# Patient Record
Sex: FEMALE | Race: BLACK OR AFRICAN AMERICAN
[De-identification: names, ages, dates, MRNs, and addresses within clinical notes are randomized per-mention and may not be internally consistent; named-entity substitution may affect disease eponyms.]

---

## 2018-03-13 ENCOUNTER — RESULT REVIEW (OUTPATIENT)
Age: 73
End: 2018-03-13

## 2018-05-11 PROBLEM — Z00.00 ENCOUNTER FOR PREVENTIVE HEALTH EXAMINATION: Status: ACTIVE | Noted: 2018-05-11

## 2018-05-14 ENCOUNTER — OUTPATIENT (OUTPATIENT)
Dept: OUTPATIENT SERVICES | Facility: HOSPITAL | Age: 73
LOS: 1 days | End: 2018-05-14
Payer: MEDICARE

## 2018-05-14 VITALS
HEIGHT: 66 IN | SYSTOLIC BLOOD PRESSURE: 130 MMHG | TEMPERATURE: 98 F | DIASTOLIC BLOOD PRESSURE: 74 MMHG | HEART RATE: 64 BPM | WEIGHT: 162.92 LBS | RESPIRATION RATE: 16 BRPM

## 2018-05-14 DIAGNOSIS — Z98.890 OTHER SPECIFIED POSTPROCEDURAL STATES: Chronic | ICD-10-CM

## 2018-05-14 DIAGNOSIS — Z91.041 RADIOGRAPHIC DYE ALLERGY STATUS: ICD-10-CM

## 2018-05-14 DIAGNOSIS — I10 ESSENTIAL (PRIMARY) HYPERTENSION: ICD-10-CM

## 2018-05-14 DIAGNOSIS — N95.0 POSTMENOPAUSAL BLEEDING: ICD-10-CM

## 2018-05-14 DIAGNOSIS — N83.9 NONINFLAMMATORY DISORDER OF OVARY, FALLOPIAN TUBE AND BROAD LIGAMENT, UNSPECIFIED: ICD-10-CM

## 2018-05-14 LAB
BLD GP AB SCN SERPL QL: NEGATIVE — SIGNIFICANT CHANGE UP
BUN SERPL-MCNC: 18 MG/DL — SIGNIFICANT CHANGE UP (ref 7–23)
CALCIUM SERPL-MCNC: 9.9 MG/DL — SIGNIFICANT CHANGE UP (ref 8.4–10.5)
CHLORIDE SERPL-SCNC: 101 MMOL/L — SIGNIFICANT CHANGE UP (ref 98–107)
CO2 SERPL-SCNC: 26 MMOL/L — SIGNIFICANT CHANGE UP (ref 22–31)
CREAT SERPL-MCNC: 0.89 MG/DL — SIGNIFICANT CHANGE UP (ref 0.5–1.3)
GLUCOSE SERPL-MCNC: 82 MG/DL — SIGNIFICANT CHANGE UP (ref 70–99)
HBA1C BLD-MCNC: 5.9 % — HIGH (ref 4–5.6)
HCT VFR BLD CALC: 38.1 % — SIGNIFICANT CHANGE UP (ref 34.5–45)
HGB BLD-MCNC: 11.9 G/DL — SIGNIFICANT CHANGE UP (ref 11.5–15.5)
MCHC RBC-ENTMCNC: 22.9 PG — LOW (ref 27–34)
MCHC RBC-ENTMCNC: 31.2 % — LOW (ref 32–36)
MCV RBC AUTO: 73.4 FL — LOW (ref 80–100)
NRBC # FLD: 0 — SIGNIFICANT CHANGE UP
PLATELET # BLD AUTO: 201 K/UL — SIGNIFICANT CHANGE UP (ref 150–400)
PMV BLD: 12.5 FL — SIGNIFICANT CHANGE UP (ref 7–13)
POTASSIUM SERPL-MCNC: 3.4 MMOL/L — LOW (ref 3.5–5.3)
POTASSIUM SERPL-SCNC: 3.4 MMOL/L — LOW (ref 3.5–5.3)
RBC # BLD: 5.19 M/UL — SIGNIFICANT CHANGE UP (ref 3.8–5.2)
RBC # FLD: 14.7 % — HIGH (ref 10.3–14.5)
RH IG SCN BLD-IMP: POSITIVE — SIGNIFICANT CHANGE UP
SODIUM SERPL-SCNC: 141 MMOL/L — SIGNIFICANT CHANGE UP (ref 135–145)
WBC # BLD: 7.81 K/UL — SIGNIFICANT CHANGE UP (ref 3.8–10.5)
WBC # FLD AUTO: 7.81 K/UL — SIGNIFICANT CHANGE UP (ref 3.8–10.5)

## 2018-05-14 PROCEDURE — 93010 ELECTROCARDIOGRAM REPORT: CPT

## 2018-05-14 RX ORDER — SODIUM CHLORIDE 9 MG/ML
1000 INJECTION, SOLUTION INTRAVENOUS
Qty: 0 | Refills: 0 | Status: DISCONTINUED | OUTPATIENT
Start: 2018-05-17 | End: 2018-05-18

## 2018-05-14 RX ORDER — SODIUM CHLORIDE 9 MG/ML
3 INJECTION INTRAMUSCULAR; INTRAVENOUS; SUBCUTANEOUS EVERY 8 HOURS
Qty: 0 | Refills: 0 | Status: DISCONTINUED | OUTPATIENT
Start: 2018-05-17 | End: 2018-05-18

## 2018-05-14 NOTE — H&P PST ADULT - NEGATIVE ENMT SYMPTOMS
no dysphagia/no throat pain/no nose bleeds/no hearing difficulty/no sinus symptoms/no ear pain/no vertigo/no tinnitus

## 2018-05-14 NOTE — H&P PST ADULT - PMH
Anemia  history of  Gallstones  long time ago, managed conservatively  Glaucoma  s/p surgery with lens implant  HTN - Hypertension    Intramural leiomyoma of uterus    Noninflammatory disorder of ovary, fallopian tube and broad ligament, unspecified    Postmenopausal bleeding

## 2018-05-14 NOTE — H&P PST ADULT - NEGATIVE GASTROINTESTINAL SYMPTOMS
no melena/no diarrhea/no vomiting/no constipation/no change in bowel habits/no nausea/no abdominal pain

## 2018-05-14 NOTE — H&P PST ADULT - ATTENDING COMMENTS
patient was counseled on the risks of the total abdominal hysterectomy and bilateral salpingoophorectomy; pain, bleeding, infection, injury to adjacent organs and vessel, formation of scar tissue, VTE. Questions answered. informed consent signed.

## 2018-05-14 NOTE — H&P PST ADULT - NEGATIVE OPHTHALMOLOGIC SYMPTOMS
no loss of vision L/no blurred vision L/no pain R/no diplopia/no photophobia/no blurred vision R/no discharge R/no loss of vision R/no pain L/no discharge L

## 2018-05-14 NOTE — H&P PST ADULT - HISTORY OF PRESENT ILLNESS
73 year old female presents to presurgical testing with diagnosis of intramural leiomyoma of uterus, noninflammatory disorder of ovary, fallopian tube and broad ligament, unspecified postmenopausal bleeding scheduled for total abdominal hysterectomy bilateral salpingo oophorectomy for 5/17/18. Pt reports postmenopausal bleeding described as mild, which started in 3/2018. Pt completed an MRI and sonogram and referred for surgical intervention.

## 2018-05-14 NOTE — H&P PST ADULT - RS GEN PE MLT RESP DETAILS PC
breath sounds equal/no chest wall tenderness/no rales/no wheezes/good air movement/no rhonchi/airway patent/respirations non-labored/clear to auscultation bilaterally

## 2018-05-14 NOTE — H&P PST ADULT - NEGATIVE NEUROLOGICAL SYMPTOMS
no paresthesias/no syncope/no vertigo/no loss of sensation/no transient paralysis/no loss of consciousness/no hemiparesis/no tremors/no focal seizures/no difficulty walking/no headache/no weakness/no generalized seizures

## 2018-05-14 NOTE — H&P PST ADULT - ASSESSMENT
intramural leiomyoma of uterus  noninflammatory disorder of ovary, fallopian tube and broad ligament, unspecified  postmenopausal bleeding

## 2018-05-14 NOTE — H&P PST ADULT - PROBLEM SELECTOR PLAN 1
Pt is scheduled for total abdominal hysterectomy bilateral salpingo oophorectomy for 5/17/18. Preop instructions, pepcid, surgical scrub provided. Pt stated understanding.

## 2018-05-14 NOTE — H&P PST ADULT - PROBLEM SELECTOR PLAN 2
instructed to take atenolol/chlorthalidone on the morning of procedure. Pending medical evaluation, has appt on 5/16 12 noon.

## 2018-05-14 NOTE — H&P PST ADULT - MUSCULOSKELETAL
details… detailed exam no calf tenderness/ROM intact/no joint erythema/no joint warmth/no joint swelling/normal strength

## 2018-05-14 NOTE — H&P PST ADULT - NEGATIVE CARDIOVASCULAR SYMPTOMS
no chest pain/no paroxysmal nocturnal dyspnea/no peripheral edema/no dyspnea on exertion/no palpitations

## 2018-05-16 ENCOUNTER — TRANSCRIPTION ENCOUNTER (OUTPATIENT)
Age: 73
End: 2018-05-16

## 2018-05-17 ENCOUNTER — INPATIENT (INPATIENT)
Facility: HOSPITAL | Age: 73
LOS: 1 days | Discharge: HOME | End: 2018-05-19
Attending: OBSTETRICS & GYNECOLOGY | Admitting: OBSTETRICS & GYNECOLOGY
Payer: MEDICARE

## 2018-05-17 ENCOUNTER — RESULT REVIEW (OUTPATIENT)
Age: 73
End: 2018-05-17

## 2018-05-17 VITALS
RESPIRATION RATE: 16 BRPM | WEIGHT: 162.92 LBS | HEIGHT: 66 IN | DIASTOLIC BLOOD PRESSURE: 81 MMHG | HEART RATE: 62 BPM | OXYGEN SATURATION: 98 % | TEMPERATURE: 98 F | SYSTOLIC BLOOD PRESSURE: 145 MMHG

## 2018-05-17 DIAGNOSIS — Z98.890 OTHER SPECIFIED POSTPROCEDURAL STATES: Chronic | ICD-10-CM

## 2018-05-17 DIAGNOSIS — N83.9 NONINFLAMMATORY DISORDER OF OVARY, FALLOPIAN TUBE AND BROAD LIGAMENT, UNSPECIFIED: ICD-10-CM

## 2018-05-17 LAB
GLUCOSE BLDV-MCNC: 117 — HIGH (ref 70–99)
POTASSIUM BLDV-SCNC: 3.7 MMOL/L — SIGNIFICANT CHANGE UP (ref 3.4–4.5)
RH IG SCN BLD-IMP: POSITIVE — SIGNIFICANT CHANGE UP

## 2018-05-17 PROCEDURE — 88332 PATH CONSLTJ SURG EA ADD BLK: CPT | Mod: 26

## 2018-05-17 PROCEDURE — 88307 TISSUE EXAM BY PATHOLOGIST: CPT | Mod: 26

## 2018-05-17 PROCEDURE — 88331 PATH CONSLTJ SURG 1 BLK 1SPC: CPT | Mod: 26

## 2018-05-17 RX ORDER — HYDROMORPHONE HYDROCHLORIDE 2 MG/ML
0.5 INJECTION INTRAMUSCULAR; INTRAVENOUS; SUBCUTANEOUS
Qty: 0 | Refills: 0 | Status: DISCONTINUED | OUTPATIENT
Start: 2018-05-17 | End: 2018-05-18

## 2018-05-17 RX ORDER — HYDROMORPHONE HYDROCHLORIDE 2 MG/ML
30 INJECTION INTRAMUSCULAR; INTRAVENOUS; SUBCUTANEOUS
Qty: 0 | Refills: 0 | Status: DISCONTINUED | OUTPATIENT
Start: 2018-05-17 | End: 2018-05-18

## 2018-05-17 RX ORDER — ATENOLOL AND CHLORTHALIDONE 50; 25 MG/1; MG/1
1 TABLET ORAL
Qty: 0 | Refills: 0 | COMMUNITY

## 2018-05-17 RX ORDER — SIMETHICONE 80 MG/1
80 TABLET, CHEWABLE ORAL EVERY 8 HOURS
Qty: 0 | Refills: 0 | Status: DISCONTINUED | OUTPATIENT
Start: 2018-05-17 | End: 2018-05-19

## 2018-05-17 RX ORDER — HYDROMORPHONE HYDROCHLORIDE 2 MG/ML
0.25 INJECTION INTRAMUSCULAR; INTRAVENOUS; SUBCUTANEOUS
Qty: 0 | Refills: 0 | Status: DISCONTINUED | OUTPATIENT
Start: 2018-05-17 | End: 2018-05-17

## 2018-05-17 RX ORDER — HYDROMORPHONE HYDROCHLORIDE 2 MG/ML
0.5 INJECTION INTRAMUSCULAR; INTRAVENOUS; SUBCUTANEOUS
Qty: 0 | Refills: 0 | Status: DISCONTINUED | OUTPATIENT
Start: 2018-05-17 | End: 2018-05-17

## 2018-05-17 RX ORDER — ONDANSETRON 8 MG/1
4 TABLET, FILM COATED ORAL ONCE
Qty: 0 | Refills: 0 | Status: DISCONTINUED | OUTPATIENT
Start: 2018-05-17 | End: 2018-05-17

## 2018-05-17 RX ORDER — SODIUM CHLORIDE 9 MG/ML
1000 INJECTION, SOLUTION INTRAVENOUS
Qty: 0 | Refills: 0 | Status: DISCONTINUED | OUTPATIENT
Start: 2018-05-17 | End: 2018-05-18

## 2018-05-17 RX ORDER — ONDANSETRON 8 MG/1
4 TABLET, FILM COATED ORAL EVERY 6 HOURS
Qty: 0 | Refills: 0 | Status: DISCONTINUED | OUTPATIENT
Start: 2018-05-17 | End: 2018-05-18

## 2018-05-17 RX ORDER — DOCUSATE SODIUM 100 MG
100 CAPSULE ORAL THREE TIMES A DAY
Qty: 0 | Refills: 0 | Status: DISCONTINUED | OUTPATIENT
Start: 2018-05-17 | End: 2018-05-19

## 2018-05-17 RX ORDER — HEPARIN SODIUM 5000 [USP'U]/ML
5000 INJECTION INTRAVENOUS; SUBCUTANEOUS EVERY 12 HOURS
Qty: 0 | Refills: 0 | Status: DISCONTINUED | OUTPATIENT
Start: 2018-05-17 | End: 2018-05-19

## 2018-05-17 RX ORDER — ATENOLOL 25 MG/1
50 TABLET ORAL DAILY
Qty: 0 | Refills: 0 | Status: DISCONTINUED | OUTPATIENT
Start: 2018-05-18 | End: 2018-05-19

## 2018-05-17 RX ORDER — NALOXONE HYDROCHLORIDE 4 MG/.1ML
0.1 SPRAY NASAL
Qty: 0 | Refills: 0 | Status: DISCONTINUED | OUTPATIENT
Start: 2018-05-17 | End: 2018-05-18

## 2018-05-17 RX ADMIN — HYDROMORPHONE HYDROCHLORIDE 30 MILLILITER(S): 2 INJECTION INTRAMUSCULAR; INTRAVENOUS; SUBCUTANEOUS at 15:57

## 2018-05-17 RX ADMIN — SODIUM CHLORIDE 125 MILLILITER(S): 9 INJECTION, SOLUTION INTRAVENOUS at 14:30

## 2018-05-17 RX ADMIN — HYDROMORPHONE HYDROCHLORIDE 30 MILLILITER(S): 2 INJECTION INTRAMUSCULAR; INTRAVENOUS; SUBCUTANEOUS at 19:33

## 2018-05-17 RX ADMIN — SIMETHICONE 80 MILLIGRAM(S): 80 TABLET, CHEWABLE ORAL at 22:58

## 2018-05-17 RX ADMIN — SODIUM CHLORIDE 3 MILLILITER(S): 9 INJECTION INTRAMUSCULAR; INTRAVENOUS; SUBCUTANEOUS at 14:29

## 2018-05-17 RX ADMIN — HYDROMORPHONE HYDROCHLORIDE 30 MILLILITER(S): 2 INJECTION INTRAMUSCULAR; INTRAVENOUS; SUBCUTANEOUS at 13:30

## 2018-05-17 RX ADMIN — SODIUM CHLORIDE 3 MILLILITER(S): 9 INJECTION INTRAMUSCULAR; INTRAVENOUS; SUBCUTANEOUS at 22:46

## 2018-05-17 RX ADMIN — HEPARIN SODIUM 5000 UNIT(S): 5000 INJECTION INTRAVENOUS; SUBCUTANEOUS at 17:24

## 2018-05-17 NOTE — BRIEF OPERATIVE NOTE - POST-OP DX
Dermoid cyst  05/17/2018    Active  Nancy Nguyen  Uterine leiomyoma, unspecified location  05/17/2018    Active  Nancy Nguyen

## 2018-05-17 NOTE — CHART NOTE - NSCHARTNOTEFT_GEN_A_CORE
GYN Post-Op Check     Pt is a 73 POD#0 s/p JAMMIE Bilateral Salpingo-oophorectomy.  Pt seen and examined at bedside. Pt states mild abdominal pain. Pt has not yet been out of bed. She has not yet had a regular diet but is not having n/v.   Pt denies fever, chills, chest pain, SOB, nausea, vomiting, lightheadedness, dizziness.      T(F): 98.1 (05-17-18 @ 13:15), Max: 98.2 (05-17-18 @ 06:41)  HR: 62 (05-17-18 @ 15:56) (55 - 64)  BP: 154/73 (05-17-18 @ 15:56) (135/92 - 155/72)  RR: 16 (05-17-18 @ 15:56) (12 - 21)  SpO2: 99% (05-17-18 @ 15:56) (97% - 100%)  Wt(kg): --  I&O's Summary    17 May 2018 07:01  -  17 May 2018 17:17  --------------------------------------------------------  IN: 375 mL / OUT: 275 mL / NET: 100 mL        MEDICATIONS  (STANDING):  heparin  Injectable 5000 Unit(s) SubCutaneous every 12 hours  HYDROmorphone PCA (1 mG/mL) 30 milliLiter(s) PCA Continuous PCA Continuous  lactated ringers. 1000 milliLiter(s) (30 mL/Hr) IV Continuous <Continuous>  lactated ringers. 1000 milliLiter(s) (125 mL/Hr) IV Continuous <Continuous>  simethicone 80 milliGRAM(s) Chew every 8 hours  sodium chloride 0.9% lock flush 3 milliLiter(s) IV Push every 8 hours    MEDICATIONS  (PRN):  docusate sodium 100 milliGRAM(s) Oral three times a day PRN Stool Softening  HYDROmorphone PCA (1 mG/mL) Rescue Clinician Bolus 0.5 milliGRAM(s) IV Push every 15 minutes PRN for Pain Scale GREATER THAN 6  naloxone Injectable 0.1 milliGRAM(s) IV Push every 3 minutes PRN For ANY of the following changes in patient status:  A. RR LESS THAN 10 breaths per minute, B. Oxygen saturation LESS THAN 90%, C. Sedation score of 6  ondansetron Injectable 4 milliGRAM(s) IV Push every 6 hours PRN Nausea      Physical Exam:  Constitutional: NAD  Pulmonary: clear to auscultation bilaterally   Cardiovascular: Regular rate and rhythm   Abdomen: Dressing over incision. Soft, mildly tender. no guarding, no rebound,   Extremities: no lower extremity edema or calve tenderness. SCDs in place     LABS:                        11.9   7.81  )-----------( 201      ( 14 May 2018 17:33 )             38.1   baso x      eos x      imm gran x      lymph x      mono x      poly x        05-14 @ 17:33    141  |  101  |  18  ----------------------------<  82  3.4   |  26  |  0.89    Pt is a 73 POD#0 s/p JAMMIE Bilateral Salpingo-oophorectomy.  Neuro: c/w PCA  CV: VSS, CBC in AM  Pulm: Saturating well on room air, encourage incentive spirometry  GI: reg diet   : UOP adequate, c/w del real until POD#1  Heme: HSQ and SCDs for DVT ppx  Dispo: Continue routine post-op care    CARL Servin, pgy-1

## 2018-05-17 NOTE — BRIEF OPERATIVE NOTE - OPERATION/FINDINGS
9wk size uterus with multiple myomas. Right solid 3cm ovarian cyst, bilateral fallopian tubes appeared consistent with prior tubal ligation. Grossly normal appearing left ovary.

## 2018-05-17 NOTE — BRIEF OPERATIVE NOTE - PRE-OP DX
Adnexal mass  05/17/2018    Active  Nancy Nguyen  Postmenopausal bleeding  05/17/2018    Active  Nancy Nguyen

## 2018-05-18 ENCOUNTER — TRANSCRIPTION ENCOUNTER (OUTPATIENT)
Age: 73
End: 2018-05-18

## 2018-05-18 DIAGNOSIS — Z90.710 ACQUIRED ABSENCE OF BOTH CERVIX AND UTERUS: ICD-10-CM

## 2018-05-18 LAB
HCT VFR BLD CALC: 31.1 % — LOW (ref 34.5–45)
HGB BLD-MCNC: 9.7 G/DL — LOW (ref 11.5–15.5)
MCHC RBC-ENTMCNC: 22.5 PG — LOW (ref 27–34)
MCHC RBC-ENTMCNC: 31.2 % — LOW (ref 32–36)
MCV RBC AUTO: 72.2 FL — LOW (ref 80–100)
NRBC # FLD: 0 — SIGNIFICANT CHANGE UP
PLATELET # BLD AUTO: 144 K/UL — LOW (ref 150–400)
PMV BLD: 11.9 FL — SIGNIFICANT CHANGE UP (ref 7–13)
RBC # BLD: 4.31 M/UL — SIGNIFICANT CHANGE UP (ref 3.8–5.2)
RBC # FLD: 14.6 % — HIGH (ref 10.3–14.5)
WBC # BLD: 8.97 K/UL — SIGNIFICANT CHANGE UP (ref 3.8–10.5)
WBC # FLD AUTO: 8.97 K/UL — SIGNIFICANT CHANGE UP (ref 3.8–10.5)

## 2018-05-18 RX ORDER — OXYCODONE HYDROCHLORIDE 5 MG/1
5 TABLET ORAL
Qty: 0 | Refills: 0 | Status: DISCONTINUED | OUTPATIENT
Start: 2018-05-18 | End: 2018-05-19

## 2018-05-18 RX ORDER — OXYCODONE HYDROCHLORIDE 5 MG/1
2.5 TABLET ORAL
Qty: 0 | Refills: 0 | Status: DISCONTINUED | OUTPATIENT
Start: 2018-05-18 | End: 2018-05-19

## 2018-05-18 RX ORDER — SODIUM CHLORIDE 9 MG/ML
3 INJECTION INTRAMUSCULAR; INTRAVENOUS; SUBCUTANEOUS EVERY 8 HOURS
Qty: 0 | Refills: 0 | Status: DISCONTINUED | OUTPATIENT
Start: 2018-05-18 | End: 2018-05-19

## 2018-05-18 RX ORDER — ACETAMINOPHEN 500 MG
650 TABLET ORAL EVERY 6 HOURS
Qty: 0 | Refills: 0 | Status: DISCONTINUED | OUTPATIENT
Start: 2018-05-18 | End: 2018-05-19

## 2018-05-18 RX ADMIN — SODIUM CHLORIDE 3 MILLILITER(S): 9 INJECTION INTRAMUSCULAR; INTRAVENOUS; SUBCUTANEOUS at 13:30

## 2018-05-18 RX ADMIN — Medication 100 MILLIGRAM(S): at 06:57

## 2018-05-18 RX ADMIN — SODIUM CHLORIDE 3 MILLILITER(S): 9 INJECTION INTRAMUSCULAR; INTRAVENOUS; SUBCUTANEOUS at 06:24

## 2018-05-18 RX ADMIN — ATENOLOL 50 MILLIGRAM(S): 25 TABLET ORAL at 06:57

## 2018-05-18 RX ADMIN — Medication 650 MILLIGRAM(S): at 12:00

## 2018-05-18 RX ADMIN — Medication 650 MILLIGRAM(S): at 17:28

## 2018-05-18 RX ADMIN — Medication 650 MILLIGRAM(S): at 18:45

## 2018-05-18 RX ADMIN — SIMETHICONE 80 MILLIGRAM(S): 80 TABLET, CHEWABLE ORAL at 06:57

## 2018-05-18 RX ADMIN — SODIUM CHLORIDE 3 MILLILITER(S): 9 INJECTION INTRAMUSCULAR; INTRAVENOUS; SUBCUTANEOUS at 14:00

## 2018-05-18 RX ADMIN — Medication 650 MILLIGRAM(S): at 11:28

## 2018-05-18 RX ADMIN — SIMETHICONE 80 MILLIGRAM(S): 80 TABLET, CHEWABLE ORAL at 13:29

## 2018-05-18 RX ADMIN — HYDROMORPHONE HYDROCHLORIDE 30 MILLILITER(S): 2 INJECTION INTRAMUSCULAR; INTRAVENOUS; SUBCUTANEOUS at 07:26

## 2018-05-18 RX ADMIN — HEPARIN SODIUM 5000 UNIT(S): 5000 INJECTION INTRAVENOUS; SUBCUTANEOUS at 06:57

## 2018-05-18 RX ADMIN — SODIUM CHLORIDE 3 MILLILITER(S): 9 INJECTION INTRAMUSCULAR; INTRAVENOUS; SUBCUTANEOUS at 21:16

## 2018-05-18 RX ADMIN — SODIUM CHLORIDE 125 MILLILITER(S): 9 INJECTION, SOLUTION INTRAVENOUS at 07:26

## 2018-05-18 RX ADMIN — HEPARIN SODIUM 5000 UNIT(S): 5000 INJECTION INTRAVENOUS; SUBCUTANEOUS at 17:29

## 2018-05-18 RX ADMIN — SIMETHICONE 80 MILLIGRAM(S): 80 TABLET, CHEWABLE ORAL at 21:50

## 2018-05-18 NOTE — DISCHARGE NOTE ADULT - PATIENT PORTAL LINK FT
You can access the [facility] Patient Portal, offered by [facility], by registering with the following website: [email or web address]

## 2018-05-18 NOTE — DISCHARGE NOTE ADULT - PLAN OF CARE
Recovery Regular diet.  Resume normal activity as tolerated. Follow up at [facility] in 6 weeks.  No heavy lifting, driving, or strenuous activity for 6 weeks.  Nothing per vagina such as tampons, intercourse, douches or tub baths for 6 weeks or until you see your doctor.  Call your doctor with any signs and symptoms of infection such as fever, chills, nausea or vomiting.  Call your doctor if you're unable to tolerate food, increase in vaginal bleeding or have difficulty urinating.  Call your doctor if you have pain that is not relieved by your prescribed medications.  Notify your doctor with any other concerns. resolution of anemia dietary and supplemental iron

## 2018-05-18 NOTE — DISCHARGE NOTE ADULT - MEDICATION SUMMARY - MEDICATIONS TO TAKE
I will START or STAY ON the medications listed below when I get home from the hospital:    Motrin 600 mg oral tablet  -- 1 tab(s) by mouth every 6 hours  -- Indication: For Pain    acetaminophen 325 mg oral tablet  -- 2 tab(s) by mouth every 6 hours  -- Indication: For Pain    atenolol-chlorthalidone 50 mg-25 mg oral tablet  -- 1 tab(s) by mouth once a day  -- Indication: For Home med    ferrous sulfate 325 mg (65 mg elemental iron) oral delayed release tablet  -- 1 tab(s) by mouth every 24 hours   -- May discolor urine or feces.  Swallow whole.  Do not crush.    -- Indication: For Home med    Colace 100 mg oral capsule  -- 1 cap(s) by mouth every 8 hours, As Needed   -- Medication should be taken with plenty of water.    -- Indication: For Home med    Multiple Vitamins oral tablet  -- 1 tab(s) by mouth once a day  -- Indication: For Home med

## 2018-05-18 NOTE — DISCHARGE NOTE ADULT - CARE PROVIDER_API CALL
Sonia Soto), Obstetrics and Gynecology  [address] [place] [address]  Phone: [phone]  Fax: [phone]

## 2018-05-18 NOTE — PROGRESS NOTE ADULT - SUBJECTIVE AND OBJECTIVE BOX
Anesthesia Pain Management Service- Attending Addendum    SUBJECTIVE: Patient's pain control adequate    Therapy:	  [ X] IV PCA	   [ ] Epidural           [ ] s/p Spinal Opoid              [ ] Postpartum infusion	  [ ] Patient controlled regional anesthesia (PCRA)    [ ] prn Analgesics    Allergies    IV contrast (Hives; Other)  No Known Drug Allergies    Intolerances      MEDICATIONS  (STANDING):  acetaminophen   Tablet. 650 milliGRAM(s) Oral every 6 hours  ATENolol  Tablet 50 milliGRAM(s) Oral daily  heparin  Injectable 5000 Unit(s) SubCutaneous every 12 hours  simethicone 80 milliGRAM(s) Chew every 8 hours  sodium chloride 0.9% lock flush 3 milliLiter(s) IV Push every 8 hours    MEDICATIONS  (PRN):  docusate sodium 100 milliGRAM(s) Oral three times a day PRN Stool Softening  oxyCODONE    IR 2.5 milliGRAM(s) Oral every 3 hours PRN Moderate Pain (4 - 6)  oxyCODONE    IR 5 milliGRAM(s) Oral every 3 hours PRN Severe Pain (7 - 10)      OBJECTIVE:   [X] No new signs     [ ] Other:    Side Effects:  [X ] None			[ ] Other:      ASSESSMENT/PLAN  -Discontinue current therapy    [ ] Therapy changed to:    [ ] IV PCA       [ ] Epidural     [ X] prn Analgesics     Comments: Pain management per primary team, APS to sign off

## 2018-05-18 NOTE — PROGRESS NOTE ADULT - SUBJECTIVE AND OBJECTIVE BOX
GYN Progress Note  POD# 1  HD#2    Patient seen and examined at bedside, no acute overnight events. No acute complaints, pain well controlled.  Patient has not yet been out of bed. She has not yet passed flatus, del real in place, and she tolerated a reg diet last night. She denies CP, SOB, N/V, fevers, and chills.    Vital Signs Last 24 Hours  T(C): 37.7 (05-18-18 @ 06:23), Max: 37.7 (05-18-18 @ 01:34)  HR: 73 (05-18-18 @ 06:23) (55 - 73)  BP: 117/60 (05-18-18 @ 06:23) (117/60 - 155/72)  RR: 18 (05-18-18 @ 06:23) (12 - 21)  SpO2: 98% (05-18-18 @ 06:23) (97% - 100%)    I&O's Summary    17 May 2018 07:01  -  18 May 2018 06:38  --------------------------------------------------------  IN: 375 mL / OUT: 1225 mL / NET: -850 mL        Physical Exam:  General: NAD  CV: NR, RR, S1, S2, no M/R/G  Lungs: CTA-B  Abdomen: Soft, non-tender, non-distended,   Incision: pressure dressing in place  Ext: No pain or swelling    Labs:                        9.7    8.97  )-----------( 144      ( 18 May 2018 04:33 )             31.1   baso x      eos x      imm gran x      lymph x      mono x      poly x                    Blood Type: O Positive      MEDICATIONS  (STANDING):  ATENolol  Tablet 50 milliGRAM(s) Oral daily  heparin  Injectable 5000 Unit(s) SubCutaneous every 12 hours  HYDROmorphone PCA (1 mG/mL) 30 milliLiter(s) PCA Continuous PCA Continuous  lactated ringers. 1000 milliLiter(s) (30 mL/Hr) IV Continuous <Continuous>  lactated ringers. 1000 milliLiter(s) (125 mL/Hr) IV Continuous <Continuous>  simethicone 80 milliGRAM(s) Chew every 8 hours  sodium chloride 0.9% lock flush 3 milliLiter(s) IV Push every 8 hours    MEDICATIONS  (PRN):  docusate sodium 100 milliGRAM(s) Oral three times a day PRN Stool Softening  HYDROmorphone PCA (1 mG/mL) Rescue Clinician Bolus 0.5 milliGRAM(s) IV Push every 15 minutes PRN for Pain Scale GREATER THAN 6  naloxone Injectable 0.1 milliGRAM(s) IV Push every 3 minutes PRN For ANY of the following changes in patient status:  A. RR LESS THAN 10 breaths per minute, B. Oxygen saturation LESS THAN 90%, C. Sedation score of 6  ondansetron Injectable 4 milliGRAM(s) IV Push every 6 hours PRN Nausea

## 2018-05-18 NOTE — PROGRESS NOTE ADULT - SUBJECTIVE AND OBJECTIVE BOX
Anesthesia Pain Management Service    SUBJECTIVE: Patient is doing well with IV PCA and no significant problems reported.    Pain Scale Score	At rest: _2__ 	With Activity: ___ 	[X ] Refer to charted pain scores    THERAPY:    [ ] IV PCA Morphine		[ ] 5 mg/mL	[ ] 1 mg/mL  [X ] IV PCA Hydromorphone	[ ] 5 mg/mL	[X ] 1 mg/mL  [ ] IV PCA Fentanyl		[ ] 50 micrograms/mL    Demand dose __0.2_ lockout __6_ (minutes) Continuous Rate _0__ Total: __1.8_   mg used (in past 24 hrs)      MEDICATIONS  (STANDING):  acetaminophen   Tablet. 650 milliGRAM(s) Oral every 6 hours  ATENolol  Tablet 50 milliGRAM(s) Oral daily  heparin  Injectable 5000 Unit(s) SubCutaneous every 12 hours  lactated ringers. 1000 milliLiter(s) (30 mL/Hr) IV Continuous <Continuous>  lactated ringers. 1000 milliLiter(s) (125 mL/Hr) IV Continuous <Continuous>  simethicone 80 milliGRAM(s) Chew every 8 hours  sodium chloride 0.9% lock flush 3 milliLiter(s) IV Push every 8 hours    MEDICATIONS  (PRN):  docusate sodium 100 milliGRAM(s) Oral three times a day PRN Stool Softening  oxyCODONE    IR 2.5 milliGRAM(s) Oral every 3 hours PRN Moderate Pain (4 - 6)  oxyCODONE    IR 5 milliGRAM(s) Oral every 3 hours PRN Severe Pain (7 - 10)      OBJECTIVE: sitting up in chair     Sedation Score:	[ X] Alert	[ ] Drowsy 	[ ] Arousable	[ ] Asleep	[ ] Unresponsive    Side Effects:	[X ] None	[ ] Nausea	[ ] Vomiting	[ ] Pruritus  		[ ] Other:    Vital Signs Last 24 Hrs  T(C): 37.3 (18 May 2018 09:55), Max: 37.7 (18 May 2018 01:34)  T(F): 99.1 (18 May 2018 09:55), Max: 99.8 (18 May 2018 01:34)  HR: 63 (18 May 2018 09:55) (55 - 73)  BP: 123/60 (18 May 2018 09:55) (117/60 - 155/72)  BP(mean): --  RR: 18 (18 May 2018 09:55) (12 - 21)  SpO2: 98% (18 May 2018 09:55) (97% - 100%)    ASSESSMENT/ PLAN    Therapy to  be:	[ ] Continue   [ X] Discontinued   [X ] Change to prn Analgesics    Documentation and Verification of current medications:   [X] Done	[ ] Not done, not elligible    Comments: PRN Oral/IV opioids and/or Adjuvant medication to be ordered at this point.

## 2018-05-18 NOTE — DISCHARGE NOTE ADULT - HOSPITAL COURSE
Patient was admitted and underwent an exploratory laparotomy, Total Abdominal Hysterectomy and Bilateral Salpingo-oophorectomy on 5/17/18 with , Hct: 38.3->31.1->**. The frozen pathology showed leiomyoma of uterus and right dermoid cyst. Patient did well post operatively and del real was removed on POD#1. On discharge, she has normal vital signs, is voiding spontaneously, tolerating regular diet and ambulating without difficulty. She will have close follow up with Dr. Soto. Patient was admitted and underwent an exploratory laparotomy, Total Abdominal Hysterectomy and Bilateral Salpingo-oophorectomy on 5/17/18 with , Hct: 38.3->31.1->31.6. The frozen pathology showed leiomyoma of uterus and right dermoid cyst. Patient did well post operatively and del real was removed on POD#1. On discharge, she has normal vital signs, is voiding spontaneously, tolerating regular diet and ambulating without difficulty. She will have close follow up with Dr. Soto.

## 2018-05-18 NOTE — PROGRESS NOTE ADULT - SUBJECTIVE AND OBJECTIVE BOX
Gyn Attending on call: Full note    Pt seen and examined at bedside. Pt states mild abdominal pain. Pt [  ] ambulating, tolerating ___ diet, [  ] flatus, [  ]BM, [  ] urinating adequately.   Pt denies fever, chills, chest pain, SOB, nausea, vomiting, lightheadedness, dizziness.      T(F): 99.1 (05-18-18 @ 09:55), Max: 99.8 (05-18-18 @ 01:34)  HR: 63 (05-18-18 @ 09:55) (55 - 73)  BP: 123/60 (05-18-18 @ 09:55) (117/60 - 155/72)  RR: 18 (05-18-18 @ 09:55) (12 - 21)  SpO2: 98% (05-18-18 @ 09:55) (97% - 100%)  Wt(kg): --  I&O's Summary    17 May 2018 07:01  -  18 May 2018 07:00  --------------------------------------------------------  IN: 375 mL / OUT: 1225 mL / NET: -850 mL    18 May 2018 07:01  -  18 May 2018 13:09  --------------------------------------------------------  IN: 0 mL / OUT: 500 mL / NET: -500 mL        MEDICATIONS  (STANDING):  acetaminophen   Tablet. 650 milliGRAM(s) Oral every 6 hours  ATENolol  Tablet 50 milliGRAM(s) Oral daily  heparin  Injectable 5000 Unit(s) SubCutaneous every 12 hours  lactated ringers. 1000 milliLiter(s) (30 mL/Hr) IV Continuous <Continuous>  lactated ringers. 1000 milliLiter(s) (125 mL/Hr) IV Continuous <Continuous>  simethicone 80 milliGRAM(s) Chew every 8 hours  sodium chloride 0.9% lock flush 3 milliLiter(s) IV Push every 8 hours    MEDICATIONS  (PRN):  docusate sodium 100 milliGRAM(s) Oral three times a day PRN Stool Softening  oxyCODONE    IR 2.5 milliGRAM(s) Oral every 3 hours PRN Moderate Pain (4 - 6)  oxyCODONE    IR 5 milliGRAM(s) Oral every 3 hours PRN Severe Pain (7 - 10)      Physical Exam:  Constitutional: NAD   Abdomen: incision site clean, dry, intact. Soft, mildly tender, [  ] distended, no guarding, no rebound, [  ] bowel sounds  Extremities: no lower extremity edema or calve tenderness. SCDs in place     LABS:                        9.7    8.97  )-----------( 144      ( 18 May 2018 04:33 )             31.1                 RADIOLOGY & ADDITIONAL TESTS: Gyn Attending on call: Full note    Pt seen and examined at bedside. Pt states mild abdominal pain. Pt [ is ] ambulating, tolerating _regular diet, [ + ] flatus, [ No ]BM, [ YES ] urinating adequately.   Pt denies fever, chills, chest pain, SOB, nausea, vomiting, lightheadedness, dizziness or calf pain     T(F): 99.1 (05-18-18 @ 09:55), Max: 99.8 (05-18-18 @ 01:34)  HR: 63 (05-18-18 @ 09:55) (55 - 73)  BP: 123/60 (05-18-18 @ 09:55) (117/60 - 155/72)  RR: 18 (05-18-18 @ 09:55) (12 - 21)  SpO2: 98% (05-18-18 @ 09:55) (97% - 100%)  Wt(kg): --  I&O's Summary    17 May 2018 07:01  -  18 May 2018 07:00  --------------------------------------------------------  IN: 375 mL / OUT: 1225 mL / NET: -850 mL    18 May 2018 07:01  -  18 May 2018 13:09  --------------------------------------------------------  IN: 0 mL / OUT: 500 mL / NET: -500 mL        MEDICATIONS  (STANDING):  acetaminophen   Tablet. 650 milliGRAM(s) Oral every 6 hours  ATENolol  Tablet 50 milliGRAM(s) Oral daily  heparin  Injectable 5000 Unit(s) SubCutaneous every 12 hours  lactated ringers. 1000 milliLiter(s) (30 mL/Hr) IV Continuous <Continuous>  lactated ringers. 1000 milliLiter(s) (125 mL/Hr) IV Continuous <Continuous>  simethicone 80 milliGRAM(s) Chew every 8 hours  sodium chloride 0.9% lock flush 3 milliLiter(s) IV Push every 8 hours    MEDICATIONS  (PRN):  docusate sodium 100 milliGRAM(s) Oral three times a day PRN Stool Softening  oxyCODONE    IR 2.5 milliGRAM(s) Oral every 3 hours PRN Moderate Pain (4 - 6)  oxyCODONE    IR 5 milliGRAM(s) Oral every 3 hours PRN Severe Pain (7 - 10)      Physical Exam:  Constitutional: NAD, AAO x 3  CVS: Positive s1S2, RRR  Lungs: CTA B/L, No W/R/R  Abdomen: incision site clean, dry, intact. Soft, mildly tender, [ NO ] distended, no guarding, no rebound, [ + ] bowel sounds  Extremities: no lower extremity edema or calve tenderness. SCDs in place     LABS:                        9.7    8.97  )-----------( 144      ( 18 May 2018 04:33 )             31.1                 RADIOLOGY & ADDITIONAL TESTS:

## 2018-05-18 NOTE — DISCHARGE NOTE ADULT - CARE PLAN
Principal Discharge DX:	S/P JAMMIE-BSO (total abdominal hysterectomy and bilateral salpingo-oophorectomy)  Goal:	Recovery  Assessment and plan of treatment:	Regular diet.  Resume normal activity as tolerated. Follow up at [facility] clinic in 6 weeks.  No heavy lifting, driving, or strenuous activity for 6 weeks.  Nothing per vagina such as tampons, intercourse, douches or tub baths for 6 weeks or until you see your doctor.  Call your doctor with any signs and symptoms of infection such as fever, chills, nausea or vomiting.  Call your doctor if you're unable to tolerate food, increase in vaginal bleeding or have difficulty urinating.  Call your doctor if you have pain that is not relieved by your prescribed medications.  Notify your doctor with any other concerns. Principal Discharge DX:	S/P JAMMIE-BSO (total abdominal hysterectomy and bilateral salpingo-oophorectomy)  Goal:	Recovery  Assessment and plan of treatment:	Regular diet.  Resume normal activity as tolerated. Follow up at [facility] clinic in 6 weeks.  No heavy lifting, driving, or strenuous activity for 6 weeks.  Nothing per vagina such as tampons, intercourse, douches or tub baths for 6 weeks or until you see your doctor.  Call your doctor with any signs and symptoms of infection such as fever, chills, nausea or vomiting.  Call your doctor if you're unable to tolerate food, increase in vaginal bleeding or have difficulty urinating.  Call your doctor if you have pain that is not relieved by your prescribed medications.  Notify your doctor with any other concerns.  Secondary Diagnosis:	Postoperative anemia due to acute blood loss  Goal:	resolution of anemia  Assessment and plan of treatment:	dietary and supplemental iron

## 2018-05-18 NOTE — DISCHARGE NOTE ADULT - CAREGIVER PHONE NUMBER
Pt canceled her appt for today due to weather and wondering if she even needs to be seen.     She is wondering if her dose of Lisinopril could be decreased. She has lost a lot of wt, about 70 lbs since originally put on it and she says it gives her a weird feeling when she works out. She feels gittery and a surge of energy after her workout that are not normal and is very sweaty. When talking to others on the medication they told her a lower dose got rid of those symptoms.     She has gone back to taking the Lisinopril-HCTZ 20/25mg and states the 25mg of HCTZ works better for her. The 12.5mg caused her to swell up more (11lbs in 1.5 weeks).    She does check her BPs with her moms machine and they have been in normal range (did not provide numbers).     Please advise if you would like to still see pt in office or if she could trial a lower dose of the lisinopril.     [phone]

## 2018-05-19 VITALS
RESPIRATION RATE: 17 BRPM | OXYGEN SATURATION: 100 % | TEMPERATURE: 98 F | DIASTOLIC BLOOD PRESSURE: 54 MMHG | HEART RATE: 67 BPM | SYSTOLIC BLOOD PRESSURE: 113 MMHG

## 2018-05-19 LAB
HCT VFR BLD CALC: 31.6 % — LOW (ref 34.5–45)
HGB BLD-MCNC: 9.7 G/DL — LOW (ref 11.5–15.5)
MCHC RBC-ENTMCNC: 22.5 PG — LOW (ref 27–34)
MCHC RBC-ENTMCNC: 30.7 % — LOW (ref 32–36)
MCV RBC AUTO: 73.1 FL — LOW (ref 80–100)
NRBC # FLD: 0 — SIGNIFICANT CHANGE UP
PLATELET # BLD AUTO: 153 K/UL — SIGNIFICANT CHANGE UP (ref 150–400)
PMV BLD: 12.9 FL — SIGNIFICANT CHANGE UP (ref 7–13)
RBC # BLD: 4.32 M/UL — SIGNIFICANT CHANGE UP (ref 3.8–5.2)
RBC # FLD: 14.8 % — HIGH (ref 10.3–14.5)
WBC # BLD: 8.09 K/UL — SIGNIFICANT CHANGE UP (ref 3.8–10.5)
WBC # FLD AUTO: 8.09 K/UL — SIGNIFICANT CHANGE UP (ref 3.8–10.5)

## 2018-05-19 RX ORDER — IBUPROFEN 200 MG
1 TABLET ORAL
Qty: 0 | Refills: 0 | COMMUNITY

## 2018-05-19 RX ORDER — FERROUS SULFATE 325(65) MG
1 TABLET ORAL
Qty: 30 | Refills: 0 | OUTPATIENT
Start: 2018-05-19

## 2018-05-19 RX ORDER — ATENOLOL 25 MG/1
50 TABLET ORAL DAILY
Qty: 0 | Refills: 0 | Status: DISCONTINUED | OUTPATIENT
Start: 2018-05-19 | End: 2018-05-19

## 2018-05-19 RX ORDER — DOCUSATE SODIUM 100 MG
1 CAPSULE ORAL
Qty: 30 | Refills: 0 | OUTPATIENT
Start: 2018-05-19

## 2018-05-19 RX ORDER — ACETAMINOPHEN 500 MG
2 TABLET ORAL
Qty: 0 | Refills: 0 | COMMUNITY
Start: 2018-05-19

## 2018-05-19 RX ADMIN — SIMETHICONE 80 MILLIGRAM(S): 80 TABLET, CHEWABLE ORAL at 05:46

## 2018-05-19 RX ADMIN — Medication 650 MILLIGRAM(S): at 06:47

## 2018-05-19 RX ADMIN — HEPARIN SODIUM 5000 UNIT(S): 5000 INJECTION INTRAVENOUS; SUBCUTANEOUS at 05:47

## 2018-05-19 RX ADMIN — Medication 650 MILLIGRAM(S): at 12:24

## 2018-05-19 RX ADMIN — Medication 650 MILLIGRAM(S): at 05:47

## 2018-05-19 RX ADMIN — SODIUM CHLORIDE 3 MILLILITER(S): 9 INJECTION INTRAMUSCULAR; INTRAVENOUS; SUBCUTANEOUS at 05:30

## 2018-05-19 RX ADMIN — Medication 100 MILLIGRAM(S): at 05:46

## 2018-05-19 NOTE — PROGRESS NOTE ADULT - PROBLEM SELECTOR PLAN 1
Neuro: c/w PCA  CV: VSS, AM CBC wnl . Hct=31.1  Pulm: Saturating well on room air, encourage incentive spirometry  GI: reg diet  : del real d/c this AM. Pt DTV  Heme: HSQ and SCDs for DVT ppx  Dispo: Continue routine post-op care    CARL Servin, pgy-1
Neuro: c/w po pain meds  CV: Hemodynamically stable  Pulm: Saturating well on room air, encourage incentive spirometry  GI: c/w regular diet  : voiding spontaneously  Heme: c/w HSQ and SCDs for DVT ppx  Dispo: Continue routine post-op care    Julia, PGY-2

## 2018-05-19 NOTE — PROGRESS NOTE ADULT - SUBJECTIVE AND OBJECTIVE BOX
Pt seen and examined at bedside. Pt states mild abdominal pain. Pt [  ] ambulating, tolerating ___ diet, [  ] flatus, [  ]BM, [  ] urinating adequately.   Pt denies fever, chills, chest pain, SOB, nausea, vomiting, lightheadedness, dizziness.      T(F): 98.3 (05-19-18 @ 08:55), Max: 99.6 (05-19-18 @ 05:56)  HR: 63 (05-19-18 @ 08:55) (59 - 99)  BP: 97/45 (05-19-18 @ 08:55) (97/45 - 140/66)  RR: 18 (05-19-18 @ 08:55) (18 - 20)  SpO2: 97% (05-19-18 @ 08:55) (97% - 98%)  Wt(kg): --  I&O's Summary    18 May 2018 07:01  -  19 May 2018 07:00  --------------------------------------------------------  IN: 0 mL / OUT: 1350 mL / NET: -1350 mL        MEDICATIONS  (STANDING):  acetaminophen   Tablet. 650 milliGRAM(s) Oral every 6 hours  ATENolol  Tablet 50 milliGRAM(s) Oral daily  heparin  Injectable 5000 Unit(s) SubCutaneous every 12 hours  simethicone 80 milliGRAM(s) Chew every 8 hours  sodium chloride 0.9% lock flush 3 milliLiter(s) IV Push every 8 hours    MEDICATIONS  (PRN):  docusate sodium 100 milliGRAM(s) Oral three times a day PRN Stool Softening  oxyCODONE    IR 2.5 milliGRAM(s) Oral every 3 hours PRN Moderate Pain (4 - 6)  oxyCODONE    IR 5 milliGRAM(s) Oral every 3 hours PRN Severe Pain (7 - 10)      Physical Exam:  Constitutional: NAD   Abdomen: incision site clean, dry, intact. Soft, mildly tender, [  ] distended, no guarding, no rebound, [  ] bowel sounds  Extremities: no lower extremity edema or calve tenderness. SCDs in place     LABS:                        9.7    8.09  )-----------( 153      ( 19 May 2018 05:40 )             31.6                 RADIOLOGY & ADDITIONAL TESTS: Gyn Attending on call: POD#2 s/p JAMMIE/BSO    Pt seen and examined at bedside. Pt states minimal to none abdominal pain. Pt [ is ] ambulating, tolerating regular diet, [ passing ] flatus, [ NO ]BM yet, pt. is urinating adequately.   Pt denies fever, chills, chest pain, SOB, nausea, vomiting, lightheadedness, dizziness.      T(F): 98.3 (05-19-18 @ 08:55), Max: 99.6 (05-19-18 @ 05:56)  HR: 63 (05-19-18 @ 08:55) (59 - 99)  BP: 97/45 (05-19-18 @ 08:55) (97/45 - 140/66)  RR: 18 (05-19-18 @ 08:55) (18 - 20)  SpO2: 97% (05-19-18 @ 08:55) (97% - 98%)  Wt(kg): --  I&O's Summary    18 May 2018 07:01  -  19 May 2018 07:00  --------------------------------------------------------  IN: 0 mL / OUT: 1350 mL / NET: -1350 mL        MEDICATIONS  (STANDING):  acetaminophen   Tablet. 650 milliGRAM(s) Oral every 6 hours  ATENolol  Tablet 50 milliGRAM(s) Oral daily  heparin  Injectable 5000 Unit(s) SubCutaneous every 12 hours  simethicone 80 milliGRAM(s) Chew every 8 hours  sodium chloride 0.9% lock flush 3 milliLiter(s) IV Push every 8 hours    MEDICATIONS  (PRN):  docusate sodium 100 milliGRAM(s) Oral three times a day PRN Stool Softening  oxyCODONE    IR 2.5 milliGRAM(s) Oral every 3 hours PRN Moderate Pain (4 - 6)  oxyCODONE    IR 5 milliGRAM(s) Oral every 3 hours PRN Severe Pain (7 - 10)      Physical Exam:  Constitutional: NAD   CVS: Positive S1S2, RRR  Lungs: CTA B/L, No W/R/R  Abdomen: incision site clean, dry, intact. Soft, minimal tenderness, not distended, no guarding, no rebound, [ + ] bowel sounds  Extremities: no lower extremity edema or calve tenderness. SCDs in place     LABS:                        9.7    8.09  )-----------( 153      ( 19 May 2018 05:40 )             31.6                 RADIOLOGY & ADDITIONAL TESTS:

## 2018-05-19 NOTE — PROGRESS NOTE ADULT - PROBLEM SELECTOR PROBLEM 1
S/P JAMMIE-BSO (total abdominal hysterectomy and bilateral salpingo-oophorectomy)
S/P JAMMIE-BSO (total abdominal hysterectomy and bilateral salpingo-oophorectomy)

## 2018-05-19 NOTE — PROGRESS NOTE ADULT - SUBJECTIVE AND OBJECTIVE BOX
INTERVAL HPI/OVERNIGHT EVENTS: Pt seen and examined at bedside.  Patient is OOB, voiding, passing flatus, tolerates diet, no n/v.  no vaginal bleeding.  MEDICATIONS  (STANDING):  acetaminophen   Tablet. 650 milliGRAM(s) Oral every 6 hours  ATENolol  Tablet 50 milliGRAM(s) Oral daily  heparin  Injectable 5000 Unit(s) SubCutaneous every 12 hours  simethicone 80 milliGRAM(s) Chew every 8 hours  sodium chloride 0.9% lock flush 3 milliLiter(s) IV Push every 8 hours    MEDICATIONS  (PRN):  docusate sodium 100 milliGRAM(s) Oral three times a day PRN Stool Softening  oxyCODONE    IR 2.5 milliGRAM(s) Oral every 3 hours PRN Moderate Pain (4 - 6)  oxyCODONE    IR 5 milliGRAM(s) Oral every 3 hours PRN Severe Pain (7 - 10)      Allergies    IV contrast (Hives; Other)  No Known Drug Allergies      12 point ROS negative except as outlined above    Vital Signs Last 24 Hrs  T(C): 36.6 (19 May 2018 12:40), Max: 37.6 (19 May 2018 05:56)  T(F): 97.8 (19 May 2018 12:40), Max: 99.6 (19 May 2018 05:56)  HR: 67 (19 May 2018 12:40) (60 - 99)  BP: 113/54 (19 May 2018 12:40) (97/45 - 140/66)  RR: 17 (19 May 2018 12:40) (17 - 20)  SpO2: 100% (19 May 2018 12:40) (97% - 100%)    Last Menstrual Period      PHYSICAL EXAM:    GA: NAD, A+0 x 3  Breasts: soft, nontender, no palpable masses  Abd: ( + ) BS, soft, nontender, nondistended, no rebound or guarding,   Incision: clean, dry and intact; steri-strips in place  Extremities: no swelling or calf tenderness, reflexes +2 bilaterally          LABS:                        9.7    8.09  )-----------( 153      ( 19 May 2018 05:40 )             31.6                         9.7    8.97  )-----------( 144      ( 18 May 2018 04:33 )             31.1

## 2018-05-19 NOTE — PROGRESS NOTE ADULT - ASSESSMENT
Pt is a 74 yo POD#1 s/p Ex -lap JAMMIE, Bilateral Salpingo-oophorectomy w/ QL block. Pt is HD stable. No acute overnight events.
74y/o POD#2 from ex-lap Total Abdominal Hysterectomy bilateral salpingo-oophorectomy  in stable condition.
POD2 s/p JAMMIE, BSO recovering well.   postop anemia   Plan: discharge home today on iron daily, follow up with me in 10-14 days, no heavy lifting, nothing per vagina
A/P:     1. POD #2 s/p JAMMIE/BSO  2. cHTN  3. Mild Anemia    Patient doing well  VSS, tolerating regular diet  D/C home  Motrin, Iron BID and Colace BID on D/C home  D/C instructions given  F/U with Dr. Soto in 2 weeks for Postop check
A/P:     1. POD# 1 s/p JAMMIE/BSO  2. HTN    Pt. doing well  Encouraged ambulation  PO Pain medication  Pain is well controlled  Increased ISM use  Anticipate D/C in am  PO Iron when D/C home  D/C instructions given  F/U with Dr. Soto in 2 weeks for postop check

## 2018-05-19 NOTE — PROGRESS NOTE ADULT - SUBJECTIVE AND OBJECTIVE BOX
R2 GYN Progress Note    Patient seen and examined at bedside, no acute overnight events. No acute complaints, pain well controlled.  Patient is ambulating, passing flatus, voiding spontaneously, and tolerating regular diet. Denies CP, SOB, N/V, fevers, and chills.    Vital Signs Last 24 Hours  T(C): 37.6 (05-19-18 @ 05:56), Max: 37.6 (05-19-18 @ 05:56)  HR: 70 (05-19-18 @ 05:56) (59 - 99)  BP: 132/62 (05-19-18 @ 05:56) (114/63 - 140/66)  RR: 18 (05-19-18 @ 05:56) (18 - 20)  SpO2: 97% (05-19-18 @ 05:56) (97% - 98%)    I&O's Summary    18 May 2018 07:01  -  19 May 2018 07:00  --------------------------------------------------------  IN: 0 mL / OUT: 1350 mL / NET: -1350 mL        Physical Exam:  General: NAD  CV: NR, RR, S1, S2, no M/R/G  Lungs: CTA-B  Abdomen: Soft, non-tender, non-distended, +BS  Incision: pffanenstiel incision, CDI  Ext: No pain or swelling    Labs:             9.7    8.09  )-----------( 153      ( 05-19 @ 05:40 )             31.6                9.7    8.97  )-----------( 144      ( 05-18 @ 04:33 )             31.1         MEDICATIONS  (STANDING):  acetaminophen   Tablet. 650 milliGRAM(s) Oral every 6 hours  ATENolol  Tablet 50 milliGRAM(s) Oral daily  heparin  Injectable 5000 Unit(s) SubCutaneous every 12 hours  simethicone 80 milliGRAM(s) Chew every 8 hours  sodium chloride 0.9% lock flush 3 milliLiter(s) IV Push every 8 hours    MEDICATIONS  (PRN):  docusate sodium 100 milliGRAM(s) Oral three times a day PRN Stool Softening  oxyCODONE    IR 2.5 milliGRAM(s) Oral every 3 hours PRN Moderate Pain (4 - 6)  oxyCODONE    IR 5 milliGRAM(s) Oral every 3 hours PRN Severe Pain (7 - 10)

## 2023-02-02 NOTE — DISCHARGE NOTE ADULT - INSTRUCTIONS
No notify MD of temp 101, chills, increased pain not relieved with pain medication, increased drainage at incision site, unable to tolerate food, nausea/vomiting, or difficulty urinating  Make sure to make follow up appointment with Dr. Soto